# Patient Record
Sex: FEMALE | Race: WHITE | Employment: FULL TIME | URBAN - METROPOLITAN AREA
[De-identification: names, ages, dates, MRNs, and addresses within clinical notes are randomized per-mention and may not be internally consistent; named-entity substitution may affect disease eponyms.]

---

## 2017-01-10 ENCOUNTER — TELEPHONE (OUTPATIENT)
Dept: NEPHROLOGY | Facility: CLINIC | Age: 27
End: 2017-01-10

## 2017-01-11 ENCOUNTER — TELEPHONE (OUTPATIENT)
Dept: NEPHROLOGY | Facility: CLINIC | Age: 27
End: 2017-01-11

## 2017-01-13 ENCOUNTER — HOSPITAL ENCOUNTER (INPATIENT)
Facility: HOSPITAL | Age: 27
LOS: 3 days | Discharge: HOME | DRG: 699 | End: 2017-01-16
Attending: EMERGENCY MEDICINE | Admitting: INTERNAL MEDICINE
Payer: COMMERCIAL

## 2017-01-13 DIAGNOSIS — L03.211 FACIAL CELLULITIS: ICD-10-CM

## 2017-01-13 DIAGNOSIS — N17.9 RENAL FAILURE (ARF), ACUTE ON CHRONIC (HCC): Primary | ICD-10-CM

## 2017-01-13 DIAGNOSIS — B02.9 HERPES ZOSTER WITHOUT COMPLICATION: ICD-10-CM

## 2017-01-13 DIAGNOSIS — N18.9 RENAL FAILURE (ARF), ACUTE ON CHRONIC (HCC): Primary | ICD-10-CM

## 2017-01-13 DIAGNOSIS — I16.0 HYPERTENSIVE URGENCY: ICD-10-CM

## 2017-01-13 PROBLEM — R73.9 HYPERGLYCEMIA: Status: ACTIVE | Noted: 2017-01-13

## 2017-01-13 PROBLEM — D64.9 ANEMIA: Status: ACTIVE | Noted: 2017-01-13

## 2017-01-13 PROBLEM — E87.6 HYPOKALEMIA: Status: ACTIVE | Noted: 2017-01-13

## 2017-01-13 PROBLEM — N19 RENAL FAILURE: Status: ACTIVE | Noted: 2017-01-13

## 2017-01-13 LAB
ALBUMIN SERPL-MCNC: 4.1 G/DL (ref 3.5–4.8)
ALP LIVER SERPL-CCNC: 100 U/L (ref 37–98)
ALT SERPL-CCNC: 12 U/L (ref 14–54)
AST SERPL-CCNC: 12 U/L (ref 15–41)
BASOPHILS # BLD AUTO: 0.08 X10(3) UL (ref 0–0.1)
BASOPHILS NFR BLD AUTO: 1.1 %
BILIRUB SERPL-MCNC: 0.2 MG/DL (ref 0.1–2)
BILIRUB UR QL STRIP.AUTO: NEGATIVE
BUN BLD-MCNC: 55 MG/DL (ref 8–20)
CALCIUM BLD-MCNC: 7.5 MG/DL (ref 8.3–10.3)
CHLORIDE: 112 MMOL/L (ref 101–111)
CLARITY UR REFRACT.AUTO: CLEAR
CO2: 16 MMOL/L (ref 22–32)
CREAT BLD-MCNC: 7.43 MG/DL (ref 0.55–1.02)
DEPRECATED HBV CORE AB SER IA-ACNC: 84.5 NG/ML (ref 10–291)
EOSINOPHIL # BLD AUTO: 0.07 X10(3) UL (ref 0–0.3)
EOSINOPHIL NFR BLD AUTO: 1 %
ERYTHROCYTE [DISTWIDTH] IN BLOOD BY AUTOMATED COUNT: 14.4 % (ref 11.5–16)
GLUCOSE BLD-MCNC: 107 MG/DL (ref 70–99)
GLUCOSE UR STRIP.AUTO-MCNC: 50 MG/DL
HCT VFR BLD AUTO: 29.6 % (ref 34–50)
HGB BLD-MCNC: 9.5 G/DL (ref 12–16)
IMMATURE GRANULOCYTE COUNT: 0.02 X10(3) UL (ref 0–1)
IMMATURE GRANULOCYTE RATIO %: 0.3 %
IRON SATURATION: 20 % (ref 13–45)
IRON: 56 UG/DL (ref 28–170)
KETONES UR STRIP.AUTO-MCNC: NEGATIVE MG/DL
LEUKOCYTE ESTERASE UR QL STRIP.AUTO: NEGATIVE
LYMPHOCYTES # BLD AUTO: 0.6 X10(3) UL (ref 0.9–4)
LYMPHOCYTES NFR BLD AUTO: 8.5 %
M PROTEIN MFR SERPL ELPH: 7.7 G/DL (ref 6.1–8.3)
MCH RBC QN AUTO: 27.8 PG (ref 27–33.2)
MCHC RBC AUTO-ENTMCNC: 32.1 G/DL (ref 31–37)
MCV RBC AUTO: 86.5 FL (ref 81–100)
MONOCYTES # BLD AUTO: 0.56 X10(3) UL (ref 0.1–0.6)
MONOCYTES NFR BLD AUTO: 7.9 %
NEUTROPHIL ABS PRELIM: 5.74 X10 (3) UL (ref 1.3–6.7)
NEUTROPHILS # BLD AUTO: 5.74 X10(3) UL (ref 1.3–6.7)
NEUTROPHILS NFR BLD AUTO: 81.2 %
NITRITE UR QL STRIP.AUTO: NEGATIVE
PH UR STRIP.AUTO: 7 [PH] (ref 4.5–8)
PLATELET # BLD AUTO: 239 10(3)UL (ref 150–450)
POTASSIUM SERPL-SCNC: 3.1 MMOL/L (ref 3.6–5.1)
PROT UR STRIP.AUTO-MCNC: 100 MG/DL
RBC # BLD AUTO: 3.42 X10(6)UL (ref 3.8–5.1)
RED CELL DISTRIBUTION WIDTH-SD: 45.2 FL (ref 35.1–46.3)
SED RATE-ML: 35 MM/HR (ref 0–25)
SODIUM SERPL-SCNC: 139 MMOL/L (ref 136–144)
SP GR UR STRIP.AUTO: 1.01 (ref 1–1.03)
TOTAL IRON BINDING CAPACITY: 277 UG/DL (ref 298–536)
TRANSFERRIN: 186 MG/DL (ref 200–360)
UROBILINOGEN UR STRIP.AUTO-MCNC: <2 MG/DL
WBC # BLD AUTO: 7.1 X10(3) UL (ref 4–13)

## 2017-01-13 PROCEDURE — 99223 1ST HOSP IP/OBS HIGH 75: CPT | Performed by: INTERNAL MEDICINE

## 2017-01-13 RX ORDER — SODIUM CHLORIDE 9 MG/ML
INJECTION, SOLUTION INTRAVENOUS CONTINUOUS
Status: DISCONTINUED | OUTPATIENT
Start: 2017-01-13 | End: 2017-01-13

## 2017-01-13 RX ORDER — MORPHINE SULFATE 2 MG/ML
1 INJECTION, SOLUTION INTRAMUSCULAR; INTRAVENOUS
Status: DISCONTINUED | OUTPATIENT
Start: 2017-01-13 | End: 2017-01-16

## 2017-01-13 RX ORDER — CALCIUM CARBONATE 200(500)MG
500 TABLET,CHEWABLE ORAL
Status: DISCONTINUED | OUTPATIENT
Start: 2017-01-13 | End: 2017-01-16

## 2017-01-13 RX ORDER — BUTALBITAL, ACETAMINOPHEN AND CAFFEINE 50; 325; 40 MG/1; MG/1; MG/1
1 TABLET ORAL ONCE
Status: DISCONTINUED | OUTPATIENT
Start: 2017-01-13 | End: 2017-01-16

## 2017-01-13 RX ORDER — MAGNESIUM HYDROXIDE/ALUMINUM HYDROXICE/SIMETHICONE 120; 1200; 1200 MG/30ML; MG/30ML; MG/30ML
30 SUSPENSION ORAL 4 TIMES DAILY PRN
Status: DISCONTINUED | OUTPATIENT
Start: 2017-01-13 | End: 2017-01-16

## 2017-01-13 RX ORDER — CLINDAMYCIN PHOSPHATE 600 MG/50ML
600 INJECTION INTRAVENOUS ONCE
Status: COMPLETED | OUTPATIENT
Start: 2017-01-13 | End: 2017-01-13

## 2017-01-13 RX ORDER — FAMOTIDINE 20 MG/1
20 TABLET ORAL NIGHTLY
Status: DISCONTINUED | OUTPATIENT
Start: 2017-01-13 | End: 2017-01-14

## 2017-01-13 RX ORDER — PREDNISONE 1 MG/1
5 TABLET ORAL DAILY
Status: DISCONTINUED | OUTPATIENT
Start: 2017-01-14 | End: 2017-01-16

## 2017-01-13 RX ORDER — ARIPIPRAZOLE 15 MG/1
40 TABLET ORAL EVERY 4 HOURS
Status: DISCONTINUED | OUTPATIENT
Start: 2017-01-13 | End: 2017-01-13

## 2017-01-13 RX ORDER — AMLODIPINE BESYLATE 5 MG/1
5 TABLET ORAL DAILY
COMMUNITY
End: 2017-01-18

## 2017-01-13 RX ORDER — ACETAMINOPHEN 325 MG/1
650 TABLET ORAL EVERY 6 HOURS PRN
Status: DISCONTINUED | OUTPATIENT
Start: 2017-01-13 | End: 2017-01-16

## 2017-01-13 RX ORDER — POLYETHYLENE GLYCOL 3350 17 G/17G
17 POWDER, FOR SOLUTION ORAL DAILY PRN
Status: DISCONTINUED | OUTPATIENT
Start: 2017-01-13 | End: 2017-01-16

## 2017-01-13 RX ORDER — HYDRALAZINE HYDROCHLORIDE 20 MG/ML
10 INJECTION INTRAMUSCULAR; INTRAVENOUS EVERY 6 HOURS PRN
Status: DISCONTINUED | OUTPATIENT
Start: 2017-01-13 | End: 2017-01-16

## 2017-01-13 RX ORDER — SULFAMETHOXAZOLE AND TRIMETHOPRIM 800; 160 MG/1; MG/1
1 TABLET ORAL 2 TIMES DAILY
Status: ON HOLD | COMMUNITY
Start: 2017-01-11 | End: 2017-01-16

## 2017-01-13 RX ORDER — SODIUM PHOSPHATE, DIBASIC AND SODIUM PHOSPHATE, MONOBASIC 7; 19 G/133ML; G/133ML
1 ENEMA RECTAL ONCE AS NEEDED
Status: ACTIVE | OUTPATIENT
Start: 2017-01-13 | End: 2017-01-13

## 2017-01-13 RX ORDER — MYCOPHENOLIC ACID 360 MG/1
720 TABLET, DELAYED RELEASE ORAL 2 TIMES DAILY
Status: DISCONTINUED | OUTPATIENT
Start: 2017-01-13 | End: 2017-01-16

## 2017-01-13 RX ORDER — CYCLOSPORINE 100 MG/1
100 CAPSULE, LIQUID FILLED ORAL 2 TIMES DAILY
Status: DISCONTINUED | OUTPATIENT
Start: 2017-01-13 | End: 2017-01-16

## 2017-01-13 RX ORDER — AMLODIPINE BESYLATE 5 MG/1
10 TABLET ORAL ONCE
Status: COMPLETED | OUTPATIENT
Start: 2017-01-13 | End: 2017-01-13

## 2017-01-13 RX ORDER — BISACODYL 10 MG
10 SUPPOSITORY, RECTAL RECTAL
Status: DISCONTINUED | OUTPATIENT
Start: 2017-01-13 | End: 2017-01-16

## 2017-01-13 RX ORDER — CALCITRIOL 0.25 UG/1
0.25 CAPSULE, LIQUID FILLED ORAL DAILY
Status: DISCONTINUED | OUTPATIENT
Start: 2017-01-14 | End: 2017-01-16

## 2017-01-13 RX ORDER — POTASSIUM CHLORIDE 20 MEQ/1
40 TABLET, EXTENDED RELEASE ORAL EVERY 4 HOURS
Status: COMPLETED | OUTPATIENT
Start: 2017-01-13 | End: 2017-01-14

## 2017-01-13 RX ORDER — ONDANSETRON 2 MG/ML
4 INJECTION INTRAMUSCULAR; INTRAVENOUS EVERY 6 HOURS PRN
Status: DISCONTINUED | OUTPATIENT
Start: 2017-01-13 | End: 2017-01-16

## 2017-01-13 RX ORDER — AMLODIPINE BESYLATE 5 MG/1
5 TABLET ORAL DAILY
Status: DISCONTINUED | OUTPATIENT
Start: 2017-01-14 | End: 2017-01-16

## 2017-01-13 RX ORDER — HEPARIN SODIUM 5000 [USP'U]/ML
5000 INJECTION, SOLUTION INTRAVENOUS; SUBCUTANEOUS EVERY 12 HOURS
Status: DISCONTINUED | OUTPATIENT
Start: 2017-01-13 | End: 2017-01-16

## 2017-01-13 NOTE — ED PROVIDER NOTES
Patient Seen in: [facility] Emergency Department    History   Patient presents with:  Cellulitis (integumentary, infectious)  Nausea/Vomiting/Diarrhea (gastrointestinal)    Stated Complaint: Staff infection wound, vomiting    HPI    Patient presents Take 1 capsule (100 mg total) by mouth 2 (two) times daily. Mycophenolate Sodium 180 MG Oral Tab EC,  Take 4 tablets (720 mg total) by mouth 2 (two) times daily. predniSONE 5 MG Oral Tab,  Take 5 mg by mouth daily.        Family History   Problem Relati second raised erythematous rash with some central crusting noted to the right parietal region, faint erythema with mild edema below the right eye. Neurologic: Cranial nerves intact.      ED Course     Labs Reviewed   COMP METABOLIC PANEL (14) - Abnormal; N swelling. She is immunocompromised. Her renal function appears to be worsening. She will be admitted for IV antibiotics/antivirals and close observation. A consult was also placed to Dr. Prachi Ayoub from nephrology.   She will be admitted primarily to Dr. Napoleon Palencia

## 2017-01-13 NOTE — ED INITIAL ASSESSMENT (HPI)
Pt sts that she noticed an abscess on her right eyebrow 2 days ago. Saw PMD and placed on Bactrim and topical abx. Pt sts that it has worsened. Noted redness around right eye and she sts that she has 2 additional spots on her head.   Co pain to areas

## 2017-01-14 ENCOUNTER — APPOINTMENT (OUTPATIENT)
Dept: CT IMAGING | Facility: HOSPITAL | Age: 27
DRG: 699 | End: 2017-01-14
Attending: HOSPITALIST
Payer: COMMERCIAL

## 2017-01-14 LAB
ATRIAL RATE: 72 BPM
BASOPHILS # BLD AUTO: 0.08 X10(3) UL (ref 0–0.1)
BASOPHILS NFR BLD AUTO: 0.9 %
BUN BLD-MCNC: 54 MG/DL (ref 8–20)
CALCIUM BLD-MCNC: 7.3 MG/DL (ref 8.3–10.3)
CHLORIDE: 116 MMOL/L (ref 101–111)
CO2: 14 MMOL/L (ref 22–32)
CREAT BLD-MCNC: 7.07 MG/DL (ref 0.55–1.02)
EOSINOPHIL # BLD AUTO: 0.13 X10(3) UL (ref 0–0.3)
EOSINOPHIL NFR BLD AUTO: 1.4 %
ERYTHROCYTE [DISTWIDTH] IN BLOOD BY AUTOMATED COUNT: 14.2 % (ref 11.5–16)
GLUCOSE BLD-MCNC: 123 MG/DL (ref 70–99)
HAV IGM SER QL: 2.2 MG/DL (ref 1.7–3)
HCT VFR BLD AUTO: 28.1 % (ref 34–50)
HGB BLD-MCNC: 9 G/DL (ref 12–16)
IMMATURE GRANULOCYTE COUNT: 0.04 X10(3) UL (ref 0–1)
IMMATURE GRANULOCYTE RATIO %: 0.4 %
LYMPHOCYTES # BLD AUTO: 0.8 X10(3) UL (ref 0.9–4)
LYMPHOCYTES NFR BLD AUTO: 8.7 %
MCH RBC QN AUTO: 27.4 PG (ref 27–33.2)
MCHC RBC AUTO-ENTMCNC: 32 G/DL (ref 31–37)
MCV RBC AUTO: 85.7 FL (ref 81–100)
MONOCYTES # BLD AUTO: 0.71 X10(3) UL (ref 0.1–0.6)
MONOCYTES NFR BLD AUTO: 7.7 %
NEUTROPHIL ABS PRELIM: 7.43 X10 (3) UL (ref 1.3–6.7)
NEUTROPHILS # BLD AUTO: 7.43 X10(3) UL (ref 1.3–6.7)
NEUTROPHILS NFR BLD AUTO: 80.9 %
P AXIS: 47 DEGREES
P-R INTERVAL: 142 MS
PLATELET # BLD AUTO: 213 10(3)UL (ref 150–450)
POTASSIUM SERPL-SCNC: 3.1 MMOL/L (ref 3.6–5.1)
Q-T INTERVAL: 428 MS
QRS DURATION: 84 MS
QTC CALCULATION (BEZET): 468 MS
R AXIS: 16 DEGREES
RBC # BLD AUTO: 3.28 X10(6)UL (ref 3.8–5.1)
RED CELL DISTRIBUTION WIDTH-SD: 44.5 FL (ref 35.1–46.3)
SODIUM SERPL-SCNC: 142 MMOL/L (ref 136–144)
T AXIS: 38 DEGREES
VENTRICULAR RATE: 72 BPM
WBC # BLD AUTO: 9.2 X10(3) UL (ref 4–13)

## 2017-01-14 PROCEDURE — 70450 CT HEAD/BRAIN W/O DYE: CPT

## 2017-01-14 PROCEDURE — 99233 SBSQ HOSP IP/OBS HIGH 50: CPT | Performed by: INTERNAL MEDICINE

## 2017-01-14 PROCEDURE — 99232 SBSQ HOSP IP/OBS MODERATE 35: CPT | Performed by: HOSPITALIST

## 2017-01-14 RX ORDER — POTASSIUM CHLORIDE 20 MEQ/1
40 TABLET, EXTENDED RELEASE ORAL EVERY 4 HOURS
Status: COMPLETED | OUTPATIENT
Start: 2017-01-14 | End: 2017-01-14

## 2017-01-14 RX ORDER — SODIUM BICARBONATE 325 MG/1
650 TABLET ORAL 2 TIMES DAILY
Status: DISCONTINUED | OUTPATIENT
Start: 2017-01-14 | End: 2017-01-16

## 2017-01-14 RX ORDER — FAMOTIDINE 10 MG/ML
20 INJECTION, SOLUTION INTRAVENOUS DAILY
Status: DISCONTINUED | OUTPATIENT
Start: 2017-01-14 | End: 2017-01-16

## 2017-01-14 NOTE — PROGRESS NOTES
NAS HOSPITALIST  Progress Note     Nakul Rodriguez Agrimonti Patient Status:  Inpatient    10/1/1990 MRN [id number]   [facility] 3NE-A Attending Mahamed Israel MD   Hosp Day # 1 PCP None Pcp     Chief Complaint: Facial rash    S: Patient doing we cycloSPORINE modified  100 mg Oral BID   • Mycophenolate Sodium  720 mg Oral BID   • predniSONE  5 mg Oral Daily   • Butalbital-APAP-Caffeine  1 tablet Oral Once       ASSESSMENT / PLAN:     1.  Facial rash, likely Zoster with possible superimposed infectio

## 2017-01-14 NOTE — ED NOTES
Report given to SUSAN JONES [facility].  Notified Emeka Sepulveda of orders that were just placed per Dr. Coronel Em request.

## 2017-01-14 NOTE — PROGRESS NOTES
[facility]  Progress Note    Jacob Solid Agrimonti Patient Status:  Inpatient    10/1/1990 MRN [id number]   [facility] 3NE-A Attending Devan Canales MD   [facility] Day # 1 PCP None Pcp     No acute issues  Feels better      Current Facility-[facility] 12/25/2016, SpO2 100 %, not currently breastfeeding. General: No acute distress. Alert and oriented x 3. HEENT; MMM; R eye erica-orbial rash; + erythema; small crusted area on the eyebrow (R). Scalp with dry crusted lesions   Neck: No lymphadenopathy. participate in this patient's care. Please feel free to call me with any questions or concerns.     Benito Schreiber MD  1/14/2017

## 2017-01-14 NOTE — PROGRESS NOTES
ASSUMED CARE OF PT A&OX4. VSS. AFEBRILE. BREATHING EASILY ON ROOM AIR. LUNGS CTA. SMALL RED RASH NOTED TO TOP OF HEAD MORE ON THE RIGHT SIDE, A COUPLE PUSTULES NOTED AROUND RIGHT EYE AS WELL AS SLIGHT SWELLING. NO DRAINAGE NOTED.  67 Mindy Castillo SEE

## 2017-01-14 NOTE — PROGRESS NOTES
[facility] Pharmacy Note:  Renal Adjustment for Ancef (cefazolin) and Acyclovir (zovirax) and Pepcid (famotidine)    Bruna Belcher is a 27year old female who has been prescribed Ancef (cefazolin) 1 g every 8 hrs, acyclovr 10mg/kg q8 and famotidine 20mg bid.

## 2017-01-14 NOTE — ED NOTES
SPOKE WITH DR CROUCH, ORDERS RECEIVED, DR PARADA PLACED ORDERS IN TO EPIC. PT UPDATED ON ROOM ASSIGNMENT AND PENDING TRANSFER TO HER ROOM.

## 2017-01-14 NOTE — CONSULTS
[facility]  Report of Consultation    Shena Sherwood Víctor Patient Status:  Inpatient    10/1/1990 MRN [id number]   [facility] 3NE-A Attending Tadeo Luna MD   Hosp Day # 0 PCP None Pcp     Reason for Consultation:  ANA/Stage V CKD  S/p reports that she drinks alcohol. She reports that she does not use illicit drugs.     Allergies:  No Known Allergies    Current Medications:    Current facility-administered medications:   •  0.9%  NaCl infusion, , Intravenous, Continuous  •  Heparin Sodium erythema; small crusted area on the eyebrow (R). Scalp with dry crusted lesions   Neck: No lymphadenopathy. No JVD. No carotiad bruits. Respiratory: Clear to auscultation bilaterally. No wheezes. No rhonchi.   Cardiovascular: S1, S2.  Regular rate and status appears stable.   ? If progression of CKD/underlying graft nephrophaty.    - agree w/ gentle fluids   - continue mycophenolate/cyclosporine/prednisone  - check urine chem  - bactrim discontinued  - patient is planning to follow up with her transplant

## 2017-01-14 NOTE — PROGRESS NOTES
[facility] Pharmacy Note:  Renal Adjustment for vancomycin    Araceli Bailey is a 27year old female who has been prescribed vancomycin  1 g x1 in ER. CrCl is estimated creatinine clearance is 10.6 mL/min (based on Cr of 7.43).  so the dose has been adjusted to

## 2017-01-14 NOTE — PLAN OF CARE
GASTROINTESTINAL - ADULT      •  Minimal or absence of nausea and vomiting  Progressing                METABOLIC/FLUID AND ELECTROLYTES - ADULT      •  Electrolytes maintained within normal limits  Progressing                PAIN - ADULT      •  Verbalizes

## 2017-01-14 NOTE — PLAN OF CARE
Pt called staff complaining of unusual chest sensation. Pt pointing mid sternum. States that not pain but not sure if its pressure/tightness/how describe. Along with nausea. Denies MAME. BP elevated.  PRN morphine provided, hydralazine for BP, zofran for kenneth

## 2017-01-14 NOTE — H&P
NAS HOSPITALIST  History and Physical     Arloa Lanes Agrimonti Patient Status:  Inpatient    10/1/1990 MRN [id number]   [facility] 3NE-A Attending Shirlene Davis MD   Hosp Day # 0 PCP None Pcp     Chief Complaint: periorbital swelling eryth History:   Family History   Problem Relation Age of Onset   • Cancer Maternal Grandfather      prostate   • Cancer Father    • Hypertension Father    • Cancer Paternal Grandfather        Allergies: No Known Allergies    Medications:    No current facility- intact. Musculoskeletal: Moves all extremities. Extremities: No edema or cyanosis. Integument: No rashes or lesions. Psychiatric: Appropriate mood and affect.       Diagnostic Data:      Labs:  Recent Labs   Lab  01/13/17   1559   WBC  7.1   HGB  9.5*

## 2017-01-14 NOTE — PLAN OF CARE
NURSING ADMISSION NOTE      Patient admitted via cart. Oriented to room. Safety precautions initiated. Bed in low position. Call light in reach. Lambert Mohamud     GASTROINTESTINAL - ADULT    • Minimal or absence of nausea and vomiting Progressing          METABO

## 2017-01-14 NOTE — CONSULTS
INFECTIOUS DISEASE CONSULT NOTE    Harper Prosper Agrimonti Patient Status:  Inpatient    10/1/1990 MRN [id number]   [facility] 3NE-A Attending Jane Moran MD   Hosp Day # 1 PCP None Pcp Grandfather       reports that she quit smoking about 6 years ago. Her smoking use included Cigarettes. She smoked 0.20 packs per day. She has never used smokeless tobacco. She reports that she drinks alcohol.  She reports that she does not use illicit drug HPI.    Physical Exam:  Vital signs: Blood pressure 158/105, pulse 90, temperature 97.8 °F (36.6 °C), temperature source Oral, resp.  rate 18, height 5' 6\" (1.676 m), weight 178 lb 4.8 oz (80.876 kg), last menstrual period 12/25/2016, SpO2 100 %, not curre patient recently admitted about 1 month prior with chronic intermittent nausea and vomiting with finding of group B strep in the urine as well as acute kidney injury. Treated with Keflex on discharge.      # Localized dermatomal likely Zoster in an immunoc

## 2017-01-15 LAB
BUN BLD-MCNC: 47 MG/DL (ref 8–20)
CALCIUM BLD-MCNC: 7.9 MG/DL (ref 8.3–10.3)
CHLORIDE: 115 MMOL/L (ref 101–111)
CO2: 16 MMOL/L (ref 22–32)
CREAT BLD-MCNC: 6.99 MG/DL (ref 0.55–1.02)
GLUCOSE BLD-MCNC: 84 MG/DL (ref 70–99)
HAV IGM SER QL: 2.2 MG/DL (ref 1.7–3)
POTASSIUM SERPL-SCNC: 3.6 MMOL/L (ref 3.6–5.1)
SODIUM SERPL-SCNC: 143 MMOL/L (ref 136–144)

## 2017-01-15 PROCEDURE — 99232 SBSQ HOSP IP/OBS MODERATE 35: CPT | Performed by: HOSPITALIST

## 2017-01-15 PROCEDURE — 99233 SBSQ HOSP IP/OBS HIGH 50: CPT | Performed by: INTERNAL MEDICINE

## 2017-01-15 RX ORDER — ARIPIPRAZOLE 15 MG/1
40 TABLET ORAL ONCE
Status: COMPLETED | OUTPATIENT
Start: 2017-01-15 | End: 2017-01-15

## 2017-01-15 NOTE — PROGRESS NOTES
INFECTIOUS DISEASE PROGRESS NOTE    Criss Scott Agrimonti Patient Status:  Inpatient    10/1/1990 MRN [id number]   [facility] 3NE-A Attending Olu Stevenson MD   Hosp Day # 2 PCP None Pcp     Subjective:  Afebrile. HA and N/V yesterday.  Feels mu with a history of ESRD status post 2 renal transplant, most recently in 2013, HTN was admitted to the hospital on 1/13 with complaints of right periorbital edema, erythema, pain.  Symptoms started Monday with red patch above R eye with increased swelling.

## 2017-01-15 NOTE — PROGRESS NOTES
c/o \"headache, pounding heart beat and slight chest pressure. \" B/P 148/92. . Denies chest pain, gustavo, sob, dizziness, anxiety. Dr Josephine Cohen notified. See orders. Next RN updated.

## 2017-01-15 NOTE — PROGRESS NOTES
[facility]  Progress Note    Mar Kid Agrimonti Patient Status:  Inpatient    10/1/1990 MRN [id number]   [facility] 3NE-A Attending Ina Padilla MD   [facility] Day # 2 PCP None Pcp     No acute issues  Feels better  No n/v    Current Facilit oz, last menstrual period 12/25/2016, SpO2 98 %, not currently breastfeeding. General: No acute distress. Alert and oriented x 3. HEENT; MMM; R eye erica-orbial rash; + erythema; small crusted area on the eyebrow (R).     Scalp with dry crusted lesions   N bicarb  5. Hypokalemia -replace  6. Anemia - monitor; demarco      Thank you for allowing me to participate in this patient's care. Please feel free to call me with any questions or concerns.     Evangelina Thorne MD  1/15/2017

## 2017-01-15 NOTE — PLAN OF CARE
GASTROINTESTINAL - ADULT    • Minimal or absence of nausea and vomiting Progressing        METABOLIC/FLUID AND ELECTROLYTES - ADULT    • Electrolytes maintained within normal limits Progressing        PAIN - ADULT    • Verbalizes/displays adequate comfort

## 2017-01-15 NOTE — PROGRESS NOTES
NAS HOSPITALIST  Progress Note     Domenica Cohn Agrimonti Patient Status:  Inpatient    10/1/1990 MRN [id number]   [facility] 3NE-A Attending Tomasz Thurman MD   Hosp Day # 2 PCP None Pcp     Chief Complaint: Facial rash    S: Patient doing we Q12H   • ceFAZolin  1 g Intravenous Q24H   • acyclovir  300 mg Intravenous Q24H   • AmLODIPine Besylate  5 mg Oral Daily   • calciTRIOL  0.25 mcg Oral Daily   • cycloSPORINE modified  100 mg Oral BID   • Mycophenolate Sodium  720 mg Oral BID   • predniSONE

## 2017-01-16 VITALS
DIASTOLIC BLOOD PRESSURE: 117 MMHG | TEMPERATURE: 98 F | BODY MASS INDEX: 28.66 KG/M2 | HEIGHT: 66 IN | SYSTOLIC BLOOD PRESSURE: 154 MMHG | HEART RATE: 78 BPM | RESPIRATION RATE: 18 BRPM | WEIGHT: 178.31 LBS | OXYGEN SATURATION: 100 %

## 2017-01-16 LAB
BUN BLD-MCNC: 46 MG/DL (ref 8–20)
CALCIUM BLD-MCNC: 7.6 MG/DL (ref 8.3–10.3)
CHLORIDE: 112 MMOL/L (ref 101–111)
CO2: 20 MMOL/L (ref 22–32)
CREAT BLD-MCNC: 6.48 MG/DL (ref 0.55–1.02)
GLUCOSE BLD-MCNC: 85 MG/DL (ref 70–99)
HAV IGM SER QL: 2 MG/DL (ref 1.7–3)
POTASSIUM SERPL-SCNC: 3.4 MMOL/L (ref 3.6–5.1)
SODIUM SERPL-SCNC: 143 MMOL/L (ref 136–144)
VARICELLA-ZOSTER VIRUS BY PCR: NOT DETECTED

## 2017-01-16 PROCEDURE — 99232 SBSQ HOSP IP/OBS MODERATE 35: CPT | Performed by: INTERNAL MEDICINE

## 2017-01-16 PROCEDURE — 99239 HOSP IP/OBS DSCHRG MGMT >30: CPT | Performed by: HOSPITALIST

## 2017-01-16 RX ORDER — HYDRALAZINE HYDROCHLORIDE 50 MG/1
25 TABLET, FILM COATED ORAL 2 TIMES DAILY
Status: DISCONTINUED | OUTPATIENT
Start: 2017-01-16 | End: 2017-01-16

## 2017-01-16 RX ORDER — VALACYCLOVIR HYDROCHLORIDE 500 MG/1
0.5 TABLET, FILM COATED ORAL DAILY
Qty: 7 TABLET | Refills: 0 | Status: SHIPPED | OUTPATIENT
Start: 2017-01-16 | End: 2017-01-23

## 2017-01-16 RX ORDER — ARIPIPRAZOLE 15 MG/1
40 TABLET ORAL EVERY 4 HOURS
Status: DISCONTINUED | OUTPATIENT
Start: 2017-01-16 | End: 2017-01-16

## 2017-01-16 NOTE — CM/SW NOTE
Patient was screened during rounds and no needs are identified at this time. RN to contact SW/CM if needs arise. 01/16/17 1000   CM/SW Screening   Referral Source Social Work (self-referral)   [address] staff; Chart review;Nursing rounds

## 2017-01-16 NOTE — PLAN OF CARE
A/Ox4. Pleasant and cooperative. On contact and droplet for shingles to R eyebrow/ scalp. Possible DC today, waiting for ID. K+ level low this AM, being replaced. Denies any needs at this time. Staff will cont to monitor.     GASTROINTESTINAL - ADULT    • M

## 2017-01-16 NOTE — DIETARY NOTE
Nutrition Short Note    Dietitian consult received for renal diet education. Appropriate education and handouts provided. All questions answered. RD available PRN.     Jon Park RD, LDN

## 2017-01-16 NOTE — PROGRESS NOTES
[facility]  Progress Note    Laura Strong Víctor Patient Status:  Inpatient    10/1/1990 MRN [id number]   [facility] 3NE-A Attending Marcin Sawant MD   [address] Day # 3 PCP None Pcp     No acute issues        Current Facility-Administered Med distress. Alert and oriented x 3. HEENT; MMM; R eye erica-orbial rash; + erythema; small crusted area on the eyebrow (R). Scalp with dry crusted lesions   Neck: No lymphadenopathy. No JVD. No carotiad bruits.   Respiratory: Clear to auscultation bilater monitor; demarco    D/C planning pending ID recs    Thank you for allowing me to participate in this patient's care. Please feel free to call me with any questions or concerns.     Kayla Rios MD  1/16/2017

## 2017-01-16 NOTE — PAYOR COMM NOTE
Attending Physician: Malena Merritt MD    Review Type: ADMISSION   Reviewer: Elson Gosselin       Date: January 16, 2017 - 8:10 AM  Payor: [facility] POS/MCNP  Authorization Number: [id number]/A  Admit date: 1/13/2017  2:46 PM       REVIEWER COMMENTS  Chief Complain ceFAZolin (ANCEF) IVPB 1g/100ml in 0.9% NaCl minibag/add-van Q24H    Date Action Dose Route User    1/15/2017 2051 New Bag 1 g Intravenous Eber Bolden RN      Heparin Sodium (Porcine) 5000 UNIT/ML injection 5,000 Units Q12H    Date Action Dose Route Creatinine 6.99 (*)     GFR 7 (*)     Calcium, Total 7.9 (*)     Chloride 115 (*)     CO2 16.0 (*)     All other components within normal limits   BASIC METABOLIC PANEL (8) - Abnormal; Notable for the following:     BUN 46 (*)     Creatinine 6.48 (*) up with her transplant center for re-eval    9. Facial/scalp rash - concern for shingles - on acyclovir- will need to watch renal function closely. Also on anti-bacterial given immunocompromised state and concern for secondary bacterial infection    10.  HT

## 2017-01-16 NOTE — PROGRESS NOTES
NURSING DISCHARGE NOTE    Discharged Home via Ambulatory. Accompanied by Support staff  Belongings Taken by patient/family. DC education/ instructions provided to pt. All questions answered. Pt verbalized understanding.

## 2017-01-16 NOTE — PLAN OF CARE
GASTROINTESTINAL - ADULT        •   Minimal or absence of nausea and vomiting   Progressing                    METABOLIC/FLUID AND ELECTROLYTES - ADULT        •   Electrolytes maintained within normal limits   Progressing                    PAIN - ADULT

## 2017-01-16 NOTE — PROGRESS NOTES
[facility]                INFECTIOUS DISEASE PROGRESS NOTE    Nakul Rodriguez Agrimvictorianoi Patient Status:  Inpatient    10/1/1990 MRN [id number]   [facility] 3NE-A Attending Mahamed Israel MD   Hosp Day # 3 PCP None Pcp     Antibiotics: acyc failure, acute (HCC)     Renal failure (ARF), acute on chronic (HCC)     S/P kidney transplant     ANA (acute kidney injury) ([address]     CKD (chronic kidney disease)     Metabolic acidosis     Secondary hyperparathyroidism ([address]     Hypokalemia     Hyperglyc

## 2017-01-16 NOTE — PROGRESS NOTES
NAS HOSPITALIST  Progress Note     Doris Deep Agrimonti Patient Status:  Inpatient    10/1/1990 MRN [id number]   [facility] 3NE-A Attending Ryan Arora MD   Hosp Day # 3 PCP None Pcp     Chief Complaint: Facial rash    S: Patient doing we Oral BID   • Heparin Sodium (Porcine)  5,000 Units Subcutaneous Q12H   • ceFAZolin  1 g Intravenous Q24H   • acyclovir  300 mg Intravenous Q24H   • AmLODIPine Besylate  5 mg Oral Daily   • calciTRIOL  0.25 mcg Oral Daily   • cycloSPORINE modified  100 mg O

## 2017-01-16 NOTE — PROGRESS NOTES
[facility] Pharmacy Note:  Renal Adjustment for ACYCLOVIR    Gabriel Tijerina is a 27year old female who has been initially prescribed acyclovir 10 mg/kg IV every 8 hrs, subsequently adjusted per pharmacy protocol to 5 mg/kg (300 mg) IV q 24 hrs based on renal f

## 2017-01-17 NOTE — DISCHARGE SUMMARY
NAS HOSPITALIST  DISCHARGE SUMMARY     Arloa Lanes Agrimonti Patient Status:  Inpatient    10/1/1990 MRN [id number]   [facility] 3NE-A Attending No att. providers found   Hosp Day # 3 PCP None Pcp     Date of Admission: 2017  Date of Dis dysuria numbness tingling swelling elsewhere. Brief Synopsis: Patient was admitted with complaints of periorbital erythema and swelling. She also had a rash to the right side of her face.   She was diagnosed with zoster infection and started on IV ac Commonly known as:  BACTROBAN        Apply 1 Application topically 2 (two) times daily.     Quantity:  15 g   Refills:  0       Mycophenolate Sodium 180 MG Tbec   Last time this was given:  720 mg on 1/16/2017  8:33 AM   Commonly known as:  MYFORTIC

## 2017-01-17 NOTE — PAYOR COMM NOTE
Attending Physician: No att. providers found    Review Type: CONTINUED STAY  Reviewer: Vikki Leon     Date: January 17, 2017 - 7:45 AM  Payor: [address] [place] [address] POS/DAMIÁNNP  Authorization Number: [id number]  Admit date: 1/13/2017  2:46 PM        REVIEWER COMME Rubina  1/14/2017    1/15/17:  Temp:  [98.3 °F (36.8 °C)-98.6 °F (37 °C)] 98.6 °F (37 °C)  Pulse:  [85-88] 88  Resp:  [18] 18  BP: (137-168)/() 144/99 mmHg     Plan of care:   cont IV acyclovir, cefazolin, f/u VZV PCR, home tomorrw     MEDICATIONS Dolores George RN      predniSONE (DELTASONE) tab 5 mg     Date Action Dose Route User    Discharged on 1/16/2017 1/16/2017 0834 Given 5 mg Oral Cely Farfan RN      sodium bicarbonate tab 650 mg     Date Action Dose Route User    Discharged on 1/16

## 2017-01-18 ENCOUNTER — TELEPHONE (OUTPATIENT)
Dept: NEPHROLOGY | Facility: CLINIC | Age: 27
End: 2017-01-18

## 2017-01-18 RX ORDER — AMLODIPINE BESYLATE 10 MG/1
10 TABLET ORAL DAILY
Qty: 90 TABLET | Refills: 1 | Status: SHIPPED | OUTPATIENT
Start: 2017-01-18 | End: 2017-01-23

## 2017-01-23 ENCOUNTER — TELEPHONE (OUTPATIENT)
Dept: NEPHROLOGY | Facility: CLINIC | Age: 27
End: 2017-01-23

## 2017-01-23 RX ORDER — AMLODIPINE BESYLATE 10 MG/1
10 TABLET ORAL DAILY
Qty: 90 TABLET | Refills: 1 | Status: SHIPPED | OUTPATIENT
Start: 2017-01-23

## 2017-04-13 ENCOUNTER — TELEPHONE (OUTPATIENT)
Dept: NEPHROLOGY | Facility: CLINIC | Age: 27
End: 2017-04-13

## 2022-03-06 PROBLEM — E87.70 HYPERVOLEMIA: Status: ACTIVE | Noted: 2017-05-25

## 2022-03-06 PROBLEM — H54.62 VISION LOSS OF LEFT EYE: Status: ACTIVE | Noted: 2017-12-18

## 2022-03-06 PROBLEM — D64.9 NORMOCYTIC ANEMIA: Status: ACTIVE | Noted: 2017-01-13

## 2022-03-06 PROBLEM — T82.898A AV FISTULA OCCLUSION, INITIAL ENCOUNTER (HCC): Status: ACTIVE | Noted: 2020-08-17

## 2022-03-06 PROBLEM — T82.898A AV FISTULA OCCLUSION, INITIAL ENCOUNTER: Status: ACTIVE | Noted: 2020-08-17

## 2022-03-06 PROBLEM — H34.8120 CENTRAL RETINAL VEIN OCCLUSION WITH MACULAR EDEMA OF LEFT EYE (HCC): Status: ACTIVE | Noted: 2018-12-11

## 2022-03-06 PROBLEM — N18.6 END-STAGE RENAL DISEASE ON HEMODIALYSIS (HCC): Status: ACTIVE | Noted: 2019-11-15

## 2022-03-06 PROBLEM — Z99.2 END-STAGE RENAL DISEASE ON HEMODIALYSIS (HCC): Status: ACTIVE | Noted: 2019-11-15

## 2022-03-06 PROBLEM — T82.590A DIALYSIS AV FISTULA MALFUNCTION: Status: ACTIVE | Noted: 2020-08-31

## 2022-03-06 PROBLEM — T82.590A DIALYSIS AV FISTULA MALFUNCTION (HCC): Status: ACTIVE | Noted: 2020-08-31

## 2022-03-06 PROBLEM — R42 LIGHTHEADED: Status: ACTIVE | Noted: 2018-04-23

## 2022-03-06 PROBLEM — E83.51 HYPOCALCEMIA: Status: ACTIVE | Noted: 2017-06-28

## 2022-03-06 PROBLEM — R07.9 CHEST PAIN: Status: ACTIVE | Noted: 2017-11-30

## 2022-03-06 PROBLEM — R10.10 UPPER ABDOMINAL PAIN: Status: ACTIVE | Noted: 2018-08-27

## 2022-03-06 PROBLEM — H34.8120 CENTRAL RETINAL VEIN OCCLUSION WITH MACULAR EDEMA OF LEFT EYE: Status: ACTIVE | Noted: 2018-12-11

## 2023-02-24 PROCEDURE — 88348 ELECTRON MICROSCOPY DX: CPT | Performed by: CLINICAL MEDICAL LABORATORY

## 2023-02-28 ENCOUNTER — LAB REQUISITION (OUTPATIENT)
Dept: LAB | Age: 33
End: 2023-02-28

## 2023-03-20 LAB
ASR DISCLAIMER: NORMAL
CASE RPRT: NORMAL
CLINICAL INFO: NORMAL
PATH REPORT.FINAL DX SPEC: NORMAL
PATH REPORT.GROSS SPEC: NORMAL

## 2023-10-31 ENCOUNTER — HOSPITAL ENCOUNTER (EMERGENCY)
Facility: HOSPITAL | Age: 33
Discharge: HOME | End: 2023-10-31
Attending: EMERGENCY MEDICINE
Payer: COMMERCIAL

## 2023-10-31 ENCOUNTER — APPOINTMENT (OUTPATIENT)
Dept: CT IMAGING | Facility: HOSPITAL | Age: 33
End: 2023-10-31
Attending: EMERGENCY MEDICINE
Payer: COMMERCIAL

## 2023-10-31 VITALS
WEIGHT: 160 LBS | SYSTOLIC BLOOD PRESSURE: 140 MMHG | HEIGHT: 65 IN | OXYGEN SATURATION: 97 % | HEART RATE: 70 BPM | BODY MASS INDEX: 26.66 KG/M2 | TEMPERATURE: 97 F | DIASTOLIC BLOOD PRESSURE: 89 MMHG | RESPIRATION RATE: 20 BRPM

## 2023-10-31 DIAGNOSIS — K29.00 ACUTE SUPERFICIAL GASTRITIS WITHOUT HEMORRHAGE: ICD-10-CM

## 2023-10-31 DIAGNOSIS — N30.00 ACUTE CYSTITIS WITHOUT HEMATURIA: ICD-10-CM

## 2023-10-31 DIAGNOSIS — R10.13 EPIGASTRIC PAIN: Primary | ICD-10-CM

## 2023-10-31 LAB
ALBUMIN SERPL-MCNC: 3.9 G/DL (ref 3.4–5)
ALBUMIN/GLOB SERPL: 1.1 {RATIO} (ref 1–2)
ALP LIVER SERPL-CCNC: 61 U/L
ALT SERPL-CCNC: 123 U/L
ANION GAP SERPL CALC-SCNC: 10 MMOL/L (ref 0–18)
AST SERPL-CCNC: 82 U/L (ref 15–37)
ATRIAL RATE: 75 BPM
B-HCG UR QL: NEGATIVE
BASOPHILS # BLD AUTO: 0.02 X10(3) UL (ref 0–0.2)
BASOPHILS NFR BLD AUTO: 0.4 %
BILIRUB SERPL-MCNC: 0.5 MG/DL (ref 0.1–2)
BILIRUB UR QL STRIP.AUTO: NEGATIVE
BUN BLD-MCNC: 19 MG/DL (ref 9–23)
CALCIUM BLD-MCNC: 9.2 MG/DL (ref 8.5–10.1)
CHLORIDE SERPL-SCNC: 108 MMOL/L (ref 98–112)
CO2 SERPL-SCNC: 21 MMOL/L (ref 21–32)
COLOR UR AUTO: YELLOW
CREAT BLD-MCNC: 1.35 MG/DL
EGFRCR SERPLBLD CKD-EPI 2021: 53 ML/MIN/1.73M2 (ref 60–?)
EOSINOPHIL # BLD AUTO: 0 X10(3) UL (ref 0–0.7)
EOSINOPHIL NFR BLD AUTO: 0 %
ERYTHROCYTE [DISTWIDTH] IN BLOOD BY AUTOMATED COUNT: 12.6 %
GLOBULIN PLAS-MCNC: 3.7 G/DL (ref 2.8–4.4)
GLUCOSE BLD-MCNC: 98 MG/DL (ref 70–99)
GLUCOSE UR STRIP.AUTO-MCNC: NORMAL MG/DL
HCT VFR BLD AUTO: 42.5 %
HGB BLD-MCNC: 13.7 G/DL
IMM GRANULOCYTES # BLD AUTO: 0.02 X10(3) UL (ref 0–1)
IMM GRANULOCYTES NFR BLD: 0.4 %
KETONES UR STRIP.AUTO-MCNC: 10 MG/DL
LEUKOCYTE ESTERASE UR QL STRIP.AUTO: NEGATIVE
LIPASE SERPL-CCNC: 52 U/L (ref 13–75)
LYMPHOCYTES # BLD AUTO: 0.31 X10(3) UL (ref 1–4)
LYMPHOCYTES NFR BLD AUTO: 6.8 %
MCH RBC QN AUTO: 26.9 PG (ref 26–34)
MCHC RBC AUTO-ENTMCNC: 32.2 G/DL (ref 31–37)
MCV RBC AUTO: 83.3 FL
MONOCYTES # BLD AUTO: 0.32 X10(3) UL (ref 0.1–1)
MONOCYTES NFR BLD AUTO: 7 %
NEUTROPHILS # BLD AUTO: 3.89 X10 (3) UL (ref 1.5–7.7)
NEUTROPHILS # BLD AUTO: 3.89 X10(3) UL (ref 1.5–7.7)
NEUTROPHILS NFR BLD AUTO: 85.4 %
NITRITE UR QL STRIP.AUTO: NEGATIVE
OSMOLALITY SERPL CALC.SUM OF ELEC: 290 MOSM/KG (ref 275–295)
P AXIS: 19 DEGREES
P-R INTERVAL: 126 MS
PH UR STRIP.AUTO: 5.5 [PH] (ref 5–8)
PLATELET # BLD AUTO: 152 10(3)UL (ref 150–450)
POTASSIUM SERPL-SCNC: 4.5 MMOL/L (ref 3.5–5.1)
PROT SERPL-MCNC: 7.6 G/DL (ref 6.4–8.2)
PROT UR STRIP.AUTO-MCNC: 100 MG/DL
Q-T INTERVAL: 392 MS
QRS DURATION: 80 MS
QTC CALCULATION (BEZET): 437 MS
R AXIS: 10 DEGREES
RBC # BLD AUTO: 5.1 X10(6)UL
SODIUM SERPL-SCNC: 139 MMOL/L (ref 136–145)
SP GR UR STRIP.AUTO: >1.03 (ref 1–1.03)
T AXIS: 40 DEGREES
UROBILINOGEN UR STRIP.AUTO-MCNC: NORMAL MG/DL
VENTRICULAR RATE: 75 BPM
WBC # BLD AUTO: 4.6 X10(3) UL (ref 4–11)

## 2023-10-31 PROCEDURE — 81001 URINALYSIS AUTO W/SCOPE: CPT | Performed by: EMERGENCY MEDICINE

## 2023-10-31 PROCEDURE — 93010 ELECTROCARDIOGRAM REPORT: CPT

## 2023-10-31 PROCEDURE — 74176 CT ABD & PELVIS W/O CONTRAST: CPT | Performed by: EMERGENCY MEDICINE

## 2023-10-31 PROCEDURE — 81001 URINALYSIS AUTO W/SCOPE: CPT

## 2023-10-31 PROCEDURE — 85025 COMPLETE CBC W/AUTO DIFF WBC: CPT | Performed by: EMERGENCY MEDICINE

## 2023-10-31 PROCEDURE — 80053 COMPREHEN METABOLIC PANEL: CPT | Performed by: EMERGENCY MEDICINE

## 2023-10-31 PROCEDURE — 85025 COMPLETE CBC W/AUTO DIFF WBC: CPT

## 2023-10-31 PROCEDURE — 96374 THER/PROPH/DIAG INJ IV PUSH: CPT

## 2023-10-31 PROCEDURE — 96375 TX/PRO/DX INJ NEW DRUG ADDON: CPT

## 2023-10-31 PROCEDURE — 93005 ELECTROCARDIOGRAM TRACING: CPT

## 2023-10-31 PROCEDURE — 99285 EMERGENCY DEPT VISIT HI MDM: CPT

## 2023-10-31 PROCEDURE — 99284 EMERGENCY DEPT VISIT MOD MDM: CPT

## 2023-10-31 PROCEDURE — S0028 INJECTION, FAMOTIDINE, 20 MG: HCPCS | Performed by: EMERGENCY MEDICINE

## 2023-10-31 PROCEDURE — 83690 ASSAY OF LIPASE: CPT | Performed by: EMERGENCY MEDICINE

## 2023-10-31 PROCEDURE — 81025 URINE PREGNANCY TEST: CPT

## 2023-10-31 PROCEDURE — 80053 COMPREHEN METABOLIC PANEL: CPT

## 2023-10-31 PROCEDURE — 83690 ASSAY OF LIPASE: CPT

## 2023-10-31 RX ORDER — ASPIRIN 325 MG
325 TABLET ORAL DAILY
COMMUNITY

## 2023-10-31 RX ORDER — CEPHALEXIN 500 MG/1
500 CAPSULE ORAL 4 TIMES DAILY
Qty: 40 CAPSULE | Refills: 0 | Status: SHIPPED | OUTPATIENT
Start: 2023-10-31 | End: 2023-11-10

## 2023-10-31 RX ORDER — ONDANSETRON 2 MG/ML
4 INJECTION INTRAMUSCULAR; INTRAVENOUS ONCE
Status: COMPLETED | OUTPATIENT
Start: 2023-10-31 | End: 2023-10-31

## 2023-10-31 RX ORDER — PREDNISONE 5 MG/1
5 TABLET ORAL DAILY
COMMUNITY

## 2023-10-31 RX ORDER — SUCRALFATE ORAL 1 G/10ML
1 SUSPENSION ORAL
Qty: 400 ML | Refills: 0 | Status: SHIPPED | OUTPATIENT
Start: 2023-10-31 | End: 2023-11-10 | Stop reason: ALTCHOICE

## 2023-10-31 RX ORDER — HYDROCODONE BITARTRATE AND ACETAMINOPHEN 5; 325 MG/1; MG/1
1-2 TABLET ORAL EVERY 6 HOURS PRN
Qty: 10 TABLET | Refills: 0 | Status: SHIPPED | OUTPATIENT
Start: 2023-10-31 | End: 2023-11-10

## 2023-10-31 RX ORDER — FAMOTIDINE 10 MG/ML
20 INJECTION, SOLUTION INTRAVENOUS ONCE
Status: COMPLETED | OUTPATIENT
Start: 2023-10-31 | End: 2023-10-31

## 2023-10-31 RX ORDER — MYCOPHENOLIC ACID 360 MG/1
360 TABLET, DELAYED RELEASE ORAL 3 TIMES DAILY
COMMUNITY

## 2023-10-31 RX ORDER — LOSARTAN POTASSIUM 25 MG/1
25 TABLET ORAL DAILY
COMMUNITY

## 2023-10-31 RX ORDER — MAGNESIUM HYDROXIDE/ALUMINUM HYDROXICE/SIMETHICONE 120; 1200; 1200 MG/30ML; MG/30ML; MG/30ML
30 SUSPENSION ORAL ONCE
Status: COMPLETED | OUTPATIENT
Start: 2023-10-31 | End: 2023-10-31

## 2023-10-31 RX ORDER — TACROLIMUS 4 MG/1
6 TABLET, EXTENDED RELEASE ORAL DAILY
COMMUNITY

## 2023-10-31 RX ORDER — CLOPIDOGREL BISULFATE 75 MG/1
75 TABLET ORAL DAILY
COMMUNITY

## 2023-10-31 NOTE — ED INITIAL ASSESSMENT (HPI)
Patient to ED with c/o epigastric pain x 1 week. Patient was seen in ED on Sunday at [facility] and again at urgent care yesterday for the same. Patient states she had cardiac workup at [facility] which was negative and an US in urgent care which was also negative. Hx 3rd kidney transplant in January.

## 2023-11-03 ENCOUNTER — HOSPITAL ENCOUNTER (OUTPATIENT)
Dept: ULTRASOUND IMAGING | Facility: HOSPITAL | Age: 33
Discharge: HOME | End: 2023-11-03
Attending: STUDENT IN AN ORGANIZED HEALTH CARE EDUCATION/TRAINING PROGRAM
Payer: COMMERCIAL

## 2023-11-03 DIAGNOSIS — R74.8 ELEVATED LIVER ENZYMES: ICD-10-CM

## 2023-11-03 DIAGNOSIS — R10.13 EPIGASTRIC PAIN: ICD-10-CM

## 2023-11-03 PROCEDURE — 93976 VASCULAR STUDY: CPT | Performed by: STUDENT IN AN ORGANIZED HEALTH CARE EDUCATION/TRAINING PROGRAM

## 2023-11-03 PROCEDURE — 76700 US EXAM ABDOM COMPLETE: CPT | Performed by: STUDENT IN AN ORGANIZED HEALTH CARE EDUCATION/TRAINING PROGRAM

## 2023-11-03 PROCEDURE — 93975 VASCULAR STUDY: CPT | Performed by: STUDENT IN AN ORGANIZED HEALTH CARE EDUCATION/TRAINING PROGRAM

## 2023-11-05 ENCOUNTER — ANESTHESIA EVENT (OUTPATIENT)
Dept: ENDOSCOPY | Facility: HOSPITAL | Age: 33
End: 2023-11-05
Payer: COMMERCIAL

## 2023-11-06 ENCOUNTER — ANESTHESIA (OUTPATIENT)
Dept: ENDOSCOPY | Facility: HOSPITAL | Age: 33
End: 2023-11-06
Payer: COMMERCIAL

## 2023-11-13 ENCOUNTER — HOSPITAL ENCOUNTER (OUTPATIENT)
Facility: HOSPITAL | Age: 33
Setting detail: HOSPITAL OUTPATIENT SURGERY
Discharge: HOME | End: 2023-11-13
Attending: INTERNAL MEDICINE | Admitting: INTERNAL MEDICINE
Payer: COMMERCIAL

## 2023-11-13 VITALS
DIASTOLIC BLOOD PRESSURE: 79 MMHG | HEART RATE: 74 BPM | OXYGEN SATURATION: 100 % | SYSTOLIC BLOOD PRESSURE: 112 MMHG | TEMPERATURE: 98 F | HEIGHT: 65 IN | WEIGHT: 155 LBS | BODY MASS INDEX: 25.83 KG/M2 | RESPIRATION RATE: 14 BRPM

## 2023-11-13 DIAGNOSIS — R10.13 EPIGASTRIC PAIN: ICD-10-CM

## 2023-11-13 DIAGNOSIS — R74.8 ELEVATED LIVER ENZYMES: ICD-10-CM

## 2023-11-13 LAB — B-HCG UR QL: NEGATIVE

## 2023-11-13 PROCEDURE — 0DB78ZX EXCISION OF STOMACH, PYLORUS, VIA NATURAL OR ARTIFICIAL OPENING ENDOSCOPIC, DIAGNOSTIC: ICD-10-PCS | Performed by: INTERNAL MEDICINE

## 2023-11-13 PROCEDURE — 88305 TISSUE EXAM BY PATHOLOGIST: CPT | Performed by: INTERNAL MEDICINE

## 2023-11-13 PROCEDURE — 81025 URINE PREGNANCY TEST: CPT

## 2023-11-13 RX ORDER — LIDOCAINE HYDROCHLORIDE 10 MG/ML
INJECTION, SOLUTION EPIDURAL; INFILTRATION; INTRACAUDAL; PERINEURAL AS NEEDED
Status: DISCONTINUED | OUTPATIENT
Start: 2023-11-13 | End: 2023-11-13 | Stop reason: SURG

## 2023-11-13 RX ORDER — SODIUM CHLORIDE, SODIUM LACTATE, POTASSIUM CHLORIDE, CALCIUM CHLORIDE 600; 310; 30; 20 MG/100ML; MG/100ML; MG/100ML; MG/100ML
INJECTION, SOLUTION INTRAVENOUS CONTINUOUS
Status: DISCONTINUED | OUTPATIENT
Start: 2023-11-13 | End: 2023-11-13

## 2023-11-13 RX ADMIN — LIDOCAINE HYDROCHLORIDE 70 MG: 10 INJECTION, SOLUTION EPIDURAL; INFILTRATION; INTRACAUDAL; PERINEURAL at 14:03:00

## 2023-11-13 NOTE — INTERVAL H&P NOTE
Pre-op Diagnosis: Epigastric pain [R10.13]  Elevated liver enzymes [R74.8]    The above referenced H&P was reviewed by Philomena Vail DO on 11/13/2023, the patient was examined and no significant changes have occurred in the patient's condition since the H&P was performed. I discussed with the patient and/or legal representative the potential benefits, risks and side effects of this procedure; the likelihood of the patient achieving goals; and potential problems that might occur during recuperation. I discussed reasonable alternatives to the procedure, including risks, benefits and side effects related to the alternatives and risks related to not receiving this procedure. We will proceed with procedure as planned.

## 2023-11-13 NOTE — DISCHARGE INSTRUCTIONS
Home Care Instructions for Gastroscopy with Sedation    Diet:  - Resume your regular diet as tolerated unless otherwise instructed. - Start with light meals to minimize bloating.  - Do not drink alcohol today. Medication:  - If you have questions about resuming your normal medications, please contact your Primary Care Physician. Activities:  - Take it easy today. Do not return to work today. - Do not drive today. - Do not operate any machinery today (including kitchen equipment). Gastroscopy:  - You may have a sore throat for 2-3 days following the exam. This is normal. Gargling with warm salt water (1/2 tsp salt to 1 glass warm water) or using throat lozenges will help. - If you experience any sharp pain in your neck, abdomen or chest, vomiting of blood, oral temperature over 100 degrees Fahrenheit, light-headedness or dizziness, or any other problems, contact your doctor. **If unable to reach your doctor, please go to the [facility] Emergency Room**    - Your referring physician will receive a full report of your examination.  - If you do not hear from your doctor's office within two weeks of your biopsy, please call them for your results. You may be able to see your laboratory results in [email or web address] between 4 and 7 business days. In some cases, your physician may not have viewed the results before they are released to [address] If you have questions regarding your results contact the physician who ordered the test/exam by phone or via [address] by choosing \"Ask a Medical Question. \"

## 2023-11-13 NOTE — ANESTHESIA POSTPROCEDURE EVALUATION
[address] Patient Status:  Hospital Outpatient Surgery   Age/Gender 26year old female MRN [id number]   Location [address] [address] Attending Brent Clay, [address] [facility] Day # 0 PCP Geraldine Mark DO       Anesthesia Post-op Note    ESOPHAGOGASTRODUODENOSCOPY (EGD) with biopsies    Procedure Summary       Date: 11/13/23 Room / Location: [facility] ENDOSCOPY 02 / [facility] ENDOSCOPY    Anesthesia Start: 7723 Anesthesia Stop: 5479    Procedure: ESOPHAGOGASTRODUODENOSCOPY (EGD) with biopsies Diagnosis:       Epigastric pain      Elevated liver enzymes      (Gastritis)    Surgeons: Brent Clay DO Anesthesiologist: Antonia Gtz MD    Anesthesia Type: MAC ASA Status: 2            Anesthesia Type: MAC    Vitals Value Taken Time   /80 11/13/23 1416   Temp  11/13/23 1420   Pulse 96 11/13/23 1419   Resp 14 11/13/23 1415   SpO2 99 % 11/13/23 1419   Vitals shown include unfiled device data. Patient Location: Endoscopy    Anesthesia Type: MAC    Airway Patency: patent    Postop Pain Control: adequate    Mental Status: mildly sedated but able to meaningfully participate in the post-anesthesia evaluation    Nausea/Vomiting: none    Cardiopulmonary/Hydration status: stable euvolemic    Complications: no apparent anesthesia related complications    Postop vital signs: stable    Dental Exam: Unchanged from Preop    Patient to be discharged home when criteria met.

## 2025-02-27 ENCOUNTER — LAB ENCOUNTER (OUTPATIENT)
Dept: LAB | Facility: HOSPITAL | Age: 35
End: 2025-02-27
Attending: FAMILY MEDICINE
Payer: COMMERCIAL

## 2025-02-27 DIAGNOSIS — Z79.899 ENCOUNTER FOR LONG-TERM (CURRENT) DRUG USE: ICD-10-CM

## 2025-02-27 DIAGNOSIS — B25.9 CYTOMEGALOVIRAL DISEASE (HCC): ICD-10-CM

## 2025-02-27 DIAGNOSIS — Z94.0 KIDNEY REPLACED BY TRANSPLANT (HCC): Primary | ICD-10-CM

## 2025-02-27 LAB
ALBUMIN SERPL-MCNC: 4.7 G/DL (ref 3.2–4.8)
ALP LIVER SERPL-CCNC: 38 U/L
ALT SERPL-CCNC: 12 U/L
ANION GAP SERPL CALC-SCNC: 7 MMOL/L (ref 0–18)
AST SERPL-CCNC: 15 U/L (ref ?–34)
BASOPHILS # BLD AUTO: 0.05 X10(3) UL (ref 0–0.2)
BASOPHILS NFR BLD AUTO: 0.9 %
BILIRUB DIRECT SERPL-MCNC: 0.1 MG/DL (ref ?–0.3)
BILIRUB SERPL-MCNC: 0.5 MG/DL (ref 0.3–1.2)
BILIRUB UR QL STRIP.AUTO: NEGATIVE
BUN BLD-MCNC: 28 MG/DL (ref 9–23)
CALCIUM BLD-MCNC: 9.6 MG/DL (ref 8.7–10.6)
CHLORIDE SERPL-SCNC: 104 MMOL/L (ref 98–112)
CO2 SERPL-SCNC: 27 MMOL/L (ref 21–32)
COLOR UR AUTO: YELLOW
CREAT BLD-MCNC: 1.35 MG/DL
CREAT UR-SCNC: 201.2 MG/DL
EGFRCR SERPLBLD CKD-EPI 2021: 52 ML/MIN/1.73M2 (ref 60–?)
EOSINOPHIL # BLD AUTO: 0.08 X10(3) UL (ref 0–0.7)
EOSINOPHIL NFR BLD AUTO: 1.4 %
ERYTHROCYTE [DISTWIDTH] IN BLOOD BY AUTOMATED COUNT: 12.5 %
EST. AVERAGE GLUCOSE BLD GHB EST-MCNC: 91 MG/DL (ref 68–126)
FASTING STATUS PATIENT QL REPORTED: YES
GLUCOSE BLD-MCNC: 95 MG/DL (ref 70–99)
GLUCOSE UR STRIP.AUTO-MCNC: NORMAL MG/DL
HBA1C MFR BLD: 4.8 % (ref ?–5.7)
HCT VFR BLD AUTO: 37.3 %
HGB BLD-MCNC: 12.1 G/DL
IMM GRANULOCYTES # BLD AUTO: 0.02 X10(3) UL (ref 0–1)
IMM GRANULOCYTES NFR BLD: 0.4 %
KETONES UR STRIP.AUTO-MCNC: NEGATIVE MG/DL
LEUKOCYTE ESTERASE UR QL STRIP.AUTO: 250
LYMPHOCYTES # BLD AUTO: 0.56 X10(3) UL (ref 1–4)
LYMPHOCYTES NFR BLD AUTO: 10.1 %
MAGNESIUM SERPL-MCNC: 1.6 MG/DL (ref 1.6–2.6)
MCH RBC QN AUTO: 28.9 PG (ref 26–34)
MCHC RBC AUTO-ENTMCNC: 32.4 G/DL (ref 31–37)
MCV RBC AUTO: 89 FL
MONOCYTES # BLD AUTO: 0.55 X10(3) UL (ref 0.1–1)
MONOCYTES NFR BLD AUTO: 9.9 %
NEUTROPHILS # BLD AUTO: 4.31 X10 (3) UL (ref 1.5–7.7)
NEUTROPHILS # BLD AUTO: 4.31 X10(3) UL (ref 1.5–7.7)
NEUTROPHILS NFR BLD AUTO: 77.3 %
NITRITE UR QL STRIP.AUTO: NEGATIVE
OSMOLALITY SERPL CALC.SUM OF ELEC: 291 MOSM/KG (ref 275–295)
PH UR STRIP.AUTO: 5.5 [PH] (ref 5–8)
PHOSPHATE SERPL-MCNC: 3.2 MG/DL (ref 2.4–5.1)
PLATELET # BLD AUTO: 235 10(3)UL (ref 150–450)
POTASSIUM SERPL-SCNC: 3.9 MMOL/L (ref 3.5–5.1)
PROT SERPL-MCNC: 7.5 G/DL (ref 5.7–8.2)
PROT UR STRIP.AUTO-MCNC: 20 MG/DL
PROT UR-MCNC: 25.9 MG/DL (ref ?–14)
PROT/CREAT UR-RTO: 0.13
PTH-INTACT SERPL-MCNC: 50.3 PG/ML (ref 18.5–88)
RBC # BLD AUTO: 4.19 X10(6)UL
RBC UR QL AUTO: NEGATIVE
SODIUM SERPL-SCNC: 138 MMOL/L (ref 136–145)
SP GR UR STRIP.AUTO: 1.03 (ref 1–1.03)
UROBILINOGEN UR STRIP.AUTO-MCNC: NORMAL MG/DL
VIT D+METAB SERPL-MCNC: 23.1 NG/ML (ref 30–100)
WBC # BLD AUTO: 5.6 X10(3) UL (ref 4–11)

## 2025-02-27 PROCEDURE — 84100 ASSAY OF PHOSPHORUS: CPT

## 2025-02-27 PROCEDURE — 83036 HEMOGLOBIN GLYCOSYLATED A1C: CPT

## 2025-02-27 PROCEDURE — 81001 URINALYSIS AUTO W/SCOPE: CPT

## 2025-02-27 PROCEDURE — 80076 HEPATIC FUNCTION PANEL: CPT

## 2025-02-27 PROCEDURE — 82306 VITAMIN D 25 HYDROXY: CPT

## 2025-02-27 PROCEDURE — 87086 URINE CULTURE/COLONY COUNT: CPT

## 2025-02-27 PROCEDURE — 85025 COMPLETE CBC W/AUTO DIFF WBC: CPT

## 2025-02-27 PROCEDURE — 83970 ASSAY OF PARATHORMONE: CPT

## 2025-02-27 PROCEDURE — 83735 ASSAY OF MAGNESIUM: CPT

## 2025-02-27 PROCEDURE — 82570 ASSAY OF URINE CREATININE: CPT

## 2025-02-27 PROCEDURE — 84156 ASSAY OF PROTEIN URINE: CPT

## 2025-02-27 PROCEDURE — 87799 DETECT AGENT NOS DNA QUANT: CPT

## 2025-02-27 PROCEDURE — 80048 BASIC METABOLIC PNL TOTAL CA: CPT

## 2025-02-27 PROCEDURE — 36415 COLL VENOUS BLD VENIPUNCTURE: CPT

## 2025-02-28 LAB
BK VIRUS DETECT, PL, QN LOG: NOT DETECTED {LOG_IU}/ML
BK VIRUS DETECT, PL, QN: NOT DETECTED [IU]/ML
CMV DNA DETECT, QN LOG: NOT DETECTED {LOG_IU}/ML
CMV DNA DETECT, QN: NOT DETECTED [IU]/ML

## 2025-03-16 NOTE — PROGRESS NOTES
Refer to vascular surgery, Dr Tai Duran please help pt with scheduling  Given Cr and hx of renal transplant, will hold on CTA, and defer further imaging to [facility] surgery team    --    Isidoro Naylor,    Your recent ultrasound shows NO signs of blood clot in this exam. However, there is some narrowing seen in some of the blood vessels in this exam, that are in your abdomen. This may be a source of some of your symptoms, as sometimes, this can lead to reduced amount of blood flow to the appropriate structures and organs in your abdomen. Therefore I recommend that you see a vascular surgeon for further evaluation of this - I have placed this referral for you to see Dr Ara Palacios. There is also signs of mild enlargement of your spleen. I also recommend you schedule follow up appointment with your kidney transplant to discuss these findings and further management. Please let me know if you have any questions or concerns.      Sincerely,  Roel Liu MD 58 y/o M with PMH HTN (poorly controlled), DM, CKD/ESRD (not on dialysis), HLD, BPH here today to r/o seizure. Patient states he drank "2 nips" of alcohol and then began to tremble for about 5 minutes. He remembers the episode. No known hx of seizure. States he did take his BP meds yesterday morning. He denies CP or dyspna at this time. No N/V/D, fever, or chills. Patient reportedly has bed bugs per EMS.    In the ED, BP is elevated. Patient w/ poorly controlled BP at baseline. Will dose hydralazine given >200/100.    GENERAL: Awake, alert, NAD  HEENT: NC/AT, moist mucous membranes  LUNGS: CTAB, no wheezes or crackles   CARDIAC: RRR, no m/r/g  ABDOMEN: Soft, non distended, no rebound, no guarding  EXT: mild b/l lower extremity edema, no calf tenderness, 2+ DP pulses bilaterally, no deformities.  NEURO: A&Ox3. Moving all extremities.  SKIN: Warm and dry. No rash.  PSYCH: Normal affect.    Low suspicion for seizure, patient was able to recall the full episode and states he was "trembling"; likely secondary to alcohol  Patient w/ poorly controlled BP at baseline, appears with mild edema, recent admission last month at [facility] for poorly controlled BP, ESRD, uncontrolled DM, refused eval for dialysis at that time  Will plan for EKG/labs/CXR in setting of HTN, CTH to r/o intracranial pathology (low suspicion); will reassess BP after hydralazine 60 y/o M with PMH HTN (poorly controlled), DM, CKD/ESRD (not on dialysis), HLD, BPH here today to r/o seizure. Patient states he drank 3 beers and 1 nip of vodka and then began to tremble for about 5 minutes. This occurred at friend's house and was witnessed by friend. He remembers the episode and denies LOC. No tongue bite or incontinence. No known hx of seizure. States he did take his BP meds yesterday morning. He denies CP, dyspnea N/V/D, fever, or chills, weakness, changes in speech or vision. Patient reportedly has bed bugs per EMS.    In the ED, BP is elevated. Patient w/ poorly controlled BP at baseline. Will dose hydralazine given >200/100.    GENERAL: Awake, alert, NAD  HEENT: NC/AT, moist mucous membranes  LUNGS: CTAB, no wheezes or crackles   CARDIAC: RRR, no m/r/g  ABDOMEN: Soft, non distended, no rebound, no guarding  EXT: mild b/l lower extremity edema, no calf tenderness, 2+ DP pulses bilaterally, no deformities.  NEURO: A&Ox3. Moving all extremities.  SKIN: Warm and dry. No rash.  PSYCH: Normal affect.    Low suspicion for seizure, patient was able to recall the full episode and states he was "trembling"; likely secondary to alcohol  Patient w/ poorly controlled BP at baseline, appears with mild edema, recent admission last month at [facility] for poorly controlled BP, ESRD, uncontrolled DM, refused eval for dialysis at that time  Will plan for EKG/labs/CXR in setting of HTN, CTH to r/o intracranial pathology (low suspicion); will reassess BP after hydralazine 60 y/o M with PMH HTN (poorly controlled), DM, CKD/ESRD (not on dialysis), HLD, BPH here today to r/o seizure. Patient states he drank 3 beers and 1 nip of vodka and then began to tremble for about 5 minutes. This occurred at friend's house and was witnessed by friend. He remembers the episode and denies LOC. No tongue bite or incontinence. No known hx of seizure. States he did take his BP meds yesterday morning. He denies CP, dyspnea N/V/D, fever, or chills, weakness, changes in speech or vision. Patient reportedly has bed bugs per EMS.    In the ED, BP is elevated. Patient w/ poorly controlled BP at baseline. Will dose hydralazine given >200/100.    GENERAL: Awake, alert, NAD  HEENT: NC/AT, moist mucous membranes  LUNGS: CTAB, no wheezes or crackles   CARDIAC: RRR, no m/r/g  ABDOMEN: Soft, non distended, no rebound, no guarding  EXT: mild b/l lower extremity edema, no calf tenderness, 2+ DP pulses bilaterally, no deformities.  NEURO: A&Ox3. Moving all extremities.  SKIN: Warm and dry. +chronic appearing wounds on the b/l lower extremities, no active cellulitis or purulence   PSYCH: Normal affect.    Low suspicion for seizure, patient was able to recall the full episode and states he was "trembling"; likely secondary to alcohol  Patient w/ poorly controlled BP at baseline, appears with mild edema, recent admission last month at [facility] for poorly controlled BP, ESRD, uncontrolled DM, refused eval for dialysis at that time  Will plan for EKG/labs/CXR in setting of HTN, CTH to r/o intracranial pathology (low suspicion); will reassess BP after hydralazine 60 y/o M with PMH charcot arthropathy, HTN (poorly controlled), DM, CKD/ESRD (not on dialysis), HLD, BPH here today to r/o seizure. Patient states he drank 3 beers and 1 nip of vodka and then began to tremble for about 5 minutes. This occurred at friend's house and was witnessed by friend. He remembers the episode and denies LOC. No tongue bite or incontinence. No known hx of seizure. States he did take his BP meds yesterday morning. He denies CP, dyspnea N/V/D, fever, or chills, weakness, changes in speech or vision. Patient reportedly has bed bugs per EMS.    In the ED, BP is elevated. Patient w/ poorly controlled BP at baseline. Will dose hydralazine given >200/100.    GENERAL: Awake, alert, NAD  HEENT: NC/AT, moist mucous membranes  LUNGS: CTAB, no wheezes or crackles   CARDIAC: RRR, no m/r/g  ABDOMEN: Soft, non distended, no rebound, no guarding  EXT: mild b/l lower extremity edema, no calf tenderness, 2+ DP pulses bilaterally, no deformities.  NEURO: A&Ox3. Moving all extremities.  SKIN: Warm and dry. +chronic appearing wounds on the b/l lower extremities, no active cellulitis or purulence   PSYCH: Normal affect.    Low suspicion for seizure, patient was able to recall the full episode and states he was "trembling"; likely secondary to alcohol  Patient w/ poorly controlled BP at baseline, appears with mild edema, recent admission last month at [facility] for poorly controlled BP, ESRD, uncontrolled DM, refused eval for dialysis at that time  Will plan for EKG/labs/CXR in setting of HTN, CTH to r/o intracranial pathology (low suspicion); will reassess BP after hydralazine

## 2025-06-13 ENCOUNTER — HOSPITAL ENCOUNTER (EMERGENCY)
Facility: HOSPITAL | Age: 35
Discharge: HOME | End: 2025-06-14
Attending: EMERGENCY MEDICINE
Payer: COMMERCIAL

## 2025-06-13 ENCOUNTER — APPOINTMENT (OUTPATIENT)
Dept: CT IMAGING | Facility: HOSPITAL | Age: 35
End: 2025-06-13
Attending: EMERGENCY MEDICINE
Payer: COMMERCIAL

## 2025-06-13 VITALS
HEART RATE: 95 BPM | WEIGHT: 160 LBS | TEMPERATURE: 98 F | BODY MASS INDEX: 25.71 KG/M2 | OXYGEN SATURATION: 95 % | SYSTOLIC BLOOD PRESSURE: 163 MMHG | RESPIRATION RATE: 16 BRPM | HEIGHT: 66 IN | DIASTOLIC BLOOD PRESSURE: 109 MMHG

## 2025-06-13 DIAGNOSIS — S09.90XA ACUTE HEAD INJURY, INITIAL ENCOUNTER: Primary | ICD-10-CM

## 2025-06-13 DIAGNOSIS — S01.01XA LACERATION OF SCALP, INITIAL ENCOUNTER: ICD-10-CM

## 2025-06-13 PROCEDURE — 12002 RPR S/N/AX/GEN/TRNK2.6-7.5CM: CPT

## 2025-06-13 PROCEDURE — 99284 EMERGENCY DEPT VISIT MOD MDM: CPT

## 2025-06-13 PROCEDURE — 70450 CT HEAD/BRAIN W/O DYE: CPT | Performed by: EMERGENCY MEDICINE

## 2025-06-14 NOTE — ED INITIAL ASSESSMENT (HPI)
Patient here for a laceration to the top of her head. Patient was at work when an air compressor fell onto her head. Patient denies LOC, neck or back pain. Takes plavix, and asa, hx of kidney transplants last one in 2023.

## 2025-06-14 NOTE — ED PROVIDER NOTES
Patient Seen in: [facility] Emergency Department        History  Chief Complaint   Patient presents with    Laceration/Abrasion     Stated Complaint: aircompressor fell on top of head, pt reports lac to top of head.    Subjective:   HPI            34-year-old female on 2 blood thinners presented emerged part for head injury patient was at work when air compressor fell down striking the top of her head with no loss of consciousness no visual complaints no neurological plaints no other exacerbating relieving factors or associated symptoms.      Objective:     Past Medical History:    Abdominal distention    Abdominal pain    Anemia    Arrhythmia    palpitations    Back pain    Belching    Bloating    Blurred vision    Body piercing    Change in hair    Lost half of hair    Chronic cough    Constipation    Decorative tattoo    Easy bruising    Esophageal reflux    Fatigue    Feeling lonely    Fistula    left arm    Frequent use of laxatives    Lightly used daily/every other due to bowel scwrring post surgeries    Frequent UTI    Glomerulonephritis    with kidney transplant    Heart murmur    Heart palpitations    Heavy menses    since blood thinners march of 2023    High blood pressure    History of blood transfusion    2/2023    History of cardiac murmur    Hx of motion sickness    younger years    Hypercholesteremia    Irregular bowel habits    Kidney failure    3x renal transplants. Recent 1/24/23    Loss of appetite    OTHER DISEASES    post viral kidney failure s/p transplant    Painful urination    PONV (postoperative nausea and vomiting)    Renal disorder    kidney failure--    Shortness of breath    Sleep disturbance    Sputum production    Stool incontinence    Stress    Uncomfortable fullness after meals    Unspecified essential hypertension    Visual impairment    contacts/glasses    Wears glasses    Weight gain              Past Surgical History:   Procedure Laterality Date    Or/kidney trans       2004 & 2/2013    Organ transplant  10/2004. 02/2013. 01/2023    Other surgical history      Other surgical history Left     fistula    Thin prep pap  12/01/2008    abnormal and due for recheck    Transplantation of kidney      right sided, pelvic kidney                Social History     Socioeconomic History    Marital status:    Occupational History    Occupation: student   Tobacco Use    Smoking status: Former     Current packs/day: 0.00     Types: Cigarettes     Quit date: 2/16/2010     Years since quitting: 15.3    Smokeless tobacco: Never    Tobacco comments:     high recommendation for quitting   Vaping Use    Vaping status: Former   Substance and Sexual Activity    Alcohol use: Not Currently     Comment: once every 2-3 weeks    Drug use: Not Currently     Types: Cannabis    Sexual activity: Yes     Partners: Male     Birth control/protection: Condom   Other Topics Concern    Blood Transfusions No    Caffeine Concern No     Comment: 1 cups per day    Sleep Concern No    Stress Concern No    Weight Concern Yes    Exercise No     Comment: recommended    Self-Exams Yes     Social Drivers of Health     Food Insecurity: No Food Insecurity (5/31/2024)    Received from [facility]    Hunger Vital Sign     Worried About Running Out of Food in the Last Year: Never true     Ran Out of Food in the Last Year: Never true   Transportation Needs: No Transportation Needs (5/31/2024)    Received from [facility]    PRAPARE - Transportation     Lack of Transportation (Medical): No     Lack of Transportation (Non-Medical): No   Housing Stability: Low Risk  (5/31/2024)    Received from [facility]    Housing Stability Vital Sign     Unable to Pay for Housing in the Last Year: No     Number of Places Lived in the Last Year: 1     Unstable Housing in the Last Year: No                                Physical Exam    ED Triage Vitals [06/13/25 2106]   BP (!)  163/109   Pulse 95   Resp 16   Temp 97.8 °F (36.6 °C)   Temp src Oral   SpO2 95 %   O2 Device None (Room air)       Current Vitals:   Vital Signs  BP: (!) 163/109  Pulse: 95  Resp: 16  Temp: 97.8 °F (36.6 °C)  Temp src: Oral  MAP (mmHg): (!) 127    Oxygen Therapy  SpO2: 95 %  O2 Device: None (Room air)            Physical Exam     Awake alert patient appears no distress patient is 3 cm laceration to top of head otherwise HEENT exam is normal lungs clear cardiovascular exam regular rhythm abdomen soft nontender extremities no Cyanosis or edema normal gait no focal neurologic deficits      ED Course  Labs Reviewed - No data to display    ED Course as of 06/13/25 2359  ------------------------------------------------------------  Time: 06/13 2359  Value: CT BRAIN OR HEAD (CPT=70450)  Comment: Independent interpretation by ED physician of CT scan shows no acute hemorrhage     Differential diagnosis include subarachnoid hemorrhage subdural hematoma                  MDM             Medical Decision Making  34-year-old female presenting with scalp laceration.  Wound was thoroughly irrigated and cleaned for staples approximating images edges adequately.  Independent trepidation by ED physician CT scan shows no acute hemorrhage.  Patient will be discharged home is to return emerged part worsening symptoms with complaints  The patient was screened and evaluated during this visit.  As a treating physician attending to the patient, I determined, within reasonable clinical confidence and prior to discharge, that an emergency medical condition was not or was no longer present.  There was no indication for further evaluation, treatment or admission on an emergency basis.    The usual and customary discharge instructions were discussed given the patient's ER course.  We discussed signs and symptoms that should prompt the patient's immediate return to the emergency department.  Reasonable over-the-counter and prescription treatment  options and physician follow-up plan was discussed.  Patient was discharged home in good condition  This note was prepared using Dragon Medical voice recognition dictation software.  As a result errors may occur.  When identified to these areas have been corrected.  While every attempt is made to correct errors during dictation discrepancies may still exist.  Please contact if there are any errors    Problems Addressed:  Acute head injury, initial encounter: acute illness or injury  Laceration of scalp, initial encounter: acute illness or injury    Amount and/or Complexity of Data Reviewed  Radiology: ordered and independent interpretation performed. Decision-making details documented in ED Course.  ECG/medicine tests: ordered and independent interpretation performed. Decision-making details documented in ED Course.        Disposition and Plan     Clinical Impression:  1. Acute head injury, initial encounter    2. Laceration of scalp, initial encounter         Disposition:  Discharge  6/13/2025 11:59 pm    Follow-up:  No follow-up provider specified.        Medications Prescribed:  Current Discharge Medication List                Supplementary Documentation:

## 2025-06-24 ENCOUNTER — HOSPITAL ENCOUNTER (EMERGENCY)
Facility: HOSPITAL | Age: 35
Discharge: HOME | End: 2025-06-24
Attending: EMERGENCY MEDICINE
Payer: OTHER MISCELLANEOUS

## 2025-06-24 VITALS
DIASTOLIC BLOOD PRESSURE: 86 MMHG | OXYGEN SATURATION: 100 % | HEART RATE: 86 BPM | SYSTOLIC BLOOD PRESSURE: 121 MMHG | RESPIRATION RATE: 22 BRPM

## 2025-06-24 DIAGNOSIS — Z48.02 ENCOUNTER FOR STAPLE REMOVAL: Primary | ICD-10-CM

## 2025-06-24 NOTE — ED PROVIDER NOTES
Patient Seen in: [facility] Emergency Department        History  Chief Complaint   Patient presents with    Sut Stap RingRemoval     Stated Complaint:     Subjective:   HPI            Staples removed without difficulty.  Wound looks good with no sign of infection or dehiscence.      Objective:     Past Medical History:    Abdominal distention    Abdominal pain    Anemia    Arrhythmia    palpitations    Back pain    Belching    Bloating    Blurred vision    Body piercing    Change in hair    Lost half of hair    Chronic cough    Constipation    Decorative tattoo    Easy bruising    Esophageal reflux    Fatigue    Feeling lonely    Fistula    left arm    Frequent use of laxatives    Lightly used daily/every other due to bowel scwrring post surgeries    Frequent UTI    Glomerulonephritis    with kidney transplant    Heart murmur    Heart palpitations    Heavy menses    since blood thinners march of 2023    High blood pressure    History of blood transfusion    2/2023    History of cardiac murmur    Hx of motion sickness    younger years    Hypercholesteremia    Irregular bowel habits    Kidney failure    3x renal transplants. Recent 1/24/23    Loss of appetite    OTHER DISEASES    post viral kidney failure s/p transplant    Painful urination    PONV (postoperative nausea and vomiting)    Renal disorder    kidney failure--    Shortness of breath    Sleep disturbance    Sputum production    Stool incontinence    Stress    Uncomfortable fullness after meals    Unspecified essential hypertension    Visual impairment    contacts/glasses    Wears glasses    Weight gain              Past Surgical History:   Procedure Laterality Date    Or/kidney trans      2004 & 2/2013    Organ transplant  10/2004. 02/2013. 01/2023    Other surgical history      Other surgical history Left     fistula    Thin prep pap  12/01/2008    abnormal and due for recheck    Transplantation of kidney      right sided, pelvic kidney                 Social History     Socioeconomic History    Marital status:    Occupational History    Occupation: student   Tobacco Use    Smoking status: Former     Current packs/day: 0.00     Types: Cigarettes     Quit date: 2/16/2010     Years since quitting: 15.3    Smokeless tobacco: Never    Tobacco comments:     high recommendation for quitting   Vaping Use    Vaping status: Former   Substance and Sexual Activity    Alcohol use: Not Currently     Comment: once every 2-3 weeks    Drug use: Not Currently     Types: Cannabis    Sexual activity: Yes     Partners: Male     Birth control/protection: Condom   Other Topics Concern    Blood Transfusions No    Caffeine Concern No     Comment: 1 cups per day    Sleep Concern No    Stress Concern No    Weight Concern Yes    Exercise No     Comment: recommended    Self-Exams Yes     Social Drivers of Health     Food Insecurity: No Food Insecurity (5/31/2024)    Received from [facility]    Hunger Vital Sign     Worried About Running Out of Food in the Last Year: Never true     Ran Out of Food in the Last Year: Never true   Transportation Needs: No Transportation Needs (5/31/2024)    Received from [facility]    PRAPARE - Transportation     Lack of Transportation (Medical): No     Lack of Transportation (Non-Medical): No   Housing Stability: Low Risk  (5/31/2024)    Received from [facility]    Housing Stability Vital Sign     Unable to Pay for Housing in the Last Year: No     Number of Places Lived in the Last Year: 1     Unstable Housing in the Last Year: No                                Physical Exam    ED Triage Vitals [06/24/25 1340]   BP (!) 139/97   Pulse 72   Resp 18   Temp    Temp src    SpO2 100 %   O2 Device None (Room air)       Current Vitals:   Vital Signs  BP: (!) 139/97  Pulse: 72  Resp: 18    Oxygen Therapy  SpO2: 100 %  O2 Device: None (Room air)            Physical Exam           ED  Course  Labs Reviewed - No data to display                         MDM             Medical Decision Making      Disposition and Plan     Clinical Impression:  1. Encounter for staple removal         Disposition:  Discharge  6/24/2025  1:41 pm    Follow-up:  Sabrina Armijo,   [address], [facility] [address]  [facility] [address]  [phone]    Follow up  As needed    [facility] Emergency Department  [address] [address]  [phone]  Follow up  Immediately if symptoms worsen, increased concerns          Medications Prescribed:  Current Discharge Medication List                Supplementary Documentation:

## (undated) DEVICE — KIT VLV 5 PC AIR H2O SUCT BX ENDOGATOR CONN

## (undated) DEVICE — STERIS KITS

## (undated) DEVICE — 3M™ RED DOT™ MONITORING ELECTRODE WITH FOAM TAPE AND STICKY GEL, 50/BAG, 20/CASE, 72/PLT 2570: Brand: RED DOT™

## (undated) DEVICE — 1200CC GUARDIAN II: Brand: GUARDIAN

## (undated) DEVICE — BITEBLOCK ENDOSCP 60FR MAXI STRP

## (undated) DEVICE — GIJAW SINGLE-USE BIOPSY FORCEPS WITH NEEDLE: Brand: GIJAW

## (undated) DEVICE — 10FT COMBINED O2 DELIVERY/CO2 MONITORING. FILTER WITH MICROSTREAM TYPE LUER: Brand: DUAL ADULT NASAL CANNULA

## (undated) NOTE — IP AVS SNAPSHOT
[facility] [place] [address] [place], [address] ~ [phone]                Discharge Summary   1/13/2017    Maria A Julienimonti           Admission Information        Provider Department    1/13/2017 Jennifer Ibarra MD  3ne-A Mycophenolate Sodium 180 MG Tbec   Last time this was given:  720 mg on 1/16/2017  8:33 AM   Commonly known as:  MYFORTIC        Take 4 tablets (720 mg total) by mouth 2 (two) times daily.     Claude Ravi                              predniSONE 5 MG Tab 0.71 (H) (01/14/17)  0.13 (01/14/17)  0.08    (01/13/17)  81.2 (01/13/17)  8.5 (01/13/17)  7.9 (01/13/17)  1.0 (01/13/17)  1.1  (01/13/17)  5.74 (01/13/17)  0.60 (L) (01/13/17)  0.56 (01/13/17)  0.07 (01/13/17)  0.08    (12/21/16)  77.0 (12/21/16)  10.1 (1 Medicaid plans. To get signed up and covered, please call [phone] and ask to get set up for an insurance coverage that is in-network with [employer].         [employer]     Call the [employer] for assistance with your inactive [email or web address] account Use: To treat inflammation, to prevent or treat allergic reactions, chemotherapy related nausea, used as part of some chemo protocols   Most common side effects:  Increased blood sugar, high blood pressure, fluid retention, mood changes, stomach upset, head